# Patient Record
Sex: FEMALE | Race: WHITE | Employment: FULL TIME | URBAN - NONMETROPOLITAN AREA
[De-identification: names, ages, dates, MRNs, and addresses within clinical notes are randomized per-mention and may not be internally consistent; named-entity substitution may affect disease eponyms.]

---

## 2020-10-13 PROBLEM — R51.9 HEADACHE: Status: ACTIVE | Noted: 2020-10-13

## 2020-10-13 PROBLEM — F17.200 NICOTINE DEPENDENCE: Status: ACTIVE | Noted: 2020-10-13

## 2020-10-13 PROBLEM — F32.A DEPRESSION: Status: ACTIVE | Noted: 2020-10-13

## 2020-10-13 PROBLEM — R07.9 CHEST PAIN IN ADULT: Status: ACTIVE | Noted: 2020-10-13

## 2020-10-15 ENCOUNTER — APPOINTMENT (OUTPATIENT)
Dept: MRI IMAGING | Age: 60
DRG: 313 | End: 2020-10-15
Payer: COMMERCIAL

## 2020-10-15 ENCOUNTER — HOSPITAL ENCOUNTER (INPATIENT)
Age: 60
LOS: 1 days | Discharge: HOME | DRG: 313 | End: 2020-10-16
Attending: EMERGENCY MEDICINE | Admitting: INTERNAL MEDICINE
Payer: COMMERCIAL

## 2020-10-15 ENCOUNTER — APPOINTMENT (OUTPATIENT)
Dept: ULTRASOUND IMAGING | Age: 60
DRG: 313 | End: 2020-10-15
Payer: COMMERCIAL

## 2020-10-15 ENCOUNTER — APPOINTMENT (OUTPATIENT)
Dept: CT IMAGING | Age: 60
DRG: 313 | End: 2020-10-15
Payer: COMMERCIAL

## 2020-10-15 ENCOUNTER — APPOINTMENT (OUTPATIENT)
Dept: GENERAL RADIOLOGY | Age: 60
DRG: 313 | End: 2020-10-15
Payer: COMMERCIAL

## 2020-10-15 LAB
ABSOLUTE EOS #: 0.17 K/UL (ref 0–0.44)
ABSOLUTE IMMATURE GRANULOCYTE: <0.03 K/UL (ref 0–0.3)
ABSOLUTE LYMPH #: 2.13 K/UL (ref 1.1–3.7)
ABSOLUTE MONO #: 0.58 K/UL (ref 0.1–1.2)
ANION GAP SERPL CALCULATED.3IONS-SCNC: 10 MMOL/L (ref 9–17)
BASOPHILS # BLD: 1 % (ref 0–2)
BASOPHILS ABSOLUTE: 0.03 K/UL (ref 0–0.2)
BUN BLDV-MCNC: 13 MG/DL (ref 6–20)
BUN/CREAT BLD: 15 (ref 9–20)
CALCIUM SERPL-MCNC: 9.3 MG/DL (ref 8.6–10.4)
CHLORIDE BLD-SCNC: 104 MMOL/L (ref 98–107)
CO2: 25 MMOL/L (ref 20–31)
CREAT SERPL-MCNC: 0.85 MG/DL (ref 0.5–0.9)
DIFFERENTIAL TYPE: NORMAL
EOSINOPHILS RELATIVE PERCENT: 3 % (ref 1–4)
GFR AFRICAN AMERICAN: >60 ML/MIN
GFR NON-AFRICAN AMERICAN: >60 ML/MIN
GFR SERPL CREATININE-BSD FRML MDRD: ABNORMAL ML/MIN/{1.73_M2}
GFR SERPL CREATININE-BSD FRML MDRD: ABNORMAL ML/MIN/{1.73_M2}
GLUCOSE BLD-MCNC: 112 MG/DL (ref 70–99)
HCT VFR BLD CALC: 44.8 % (ref 36.3–47.1)
HEMOGLOBIN: 13.8 G/DL (ref 11.9–15.1)
IMMATURE GRANULOCYTES: 0 %
LYMPHOCYTES # BLD: 33 % (ref 24–43)
MCH RBC QN AUTO: 27 PG (ref 25.2–33.5)
MCHC RBC AUTO-ENTMCNC: 30.8 G/DL (ref 28.4–34.8)
MCV RBC AUTO: 87.7 FL (ref 82.6–102.9)
MONOCYTES # BLD: 9 % (ref 3–12)
NRBC AUTOMATED: 0 PER 100 WBC
PDW BLD-RTO: 12.5 % (ref 11.8–14.4)
PLATELET # BLD: 227 K/UL (ref 138–453)
PLATELET ESTIMATE: NORMAL
PMV BLD AUTO: 11.8 FL (ref 8.1–13.5)
POTASSIUM SERPL-SCNC: 4.1 MMOL/L (ref 3.7–5.3)
RBC # BLD: 5.11 M/UL (ref 3.95–5.11)
RBC # BLD: NORMAL 10*6/UL
SEG NEUTROPHILS: 54 % (ref 36–65)
SEGMENTED NEUTROPHILS ABSOLUTE COUNT: 3.52 K/UL (ref 1.5–8.1)
SODIUM BLD-SCNC: 139 MMOL/L (ref 135–144)
TROPONIN INTERP: NORMAL
TROPONIN INTERP: NORMAL
TROPONIN T: NORMAL NG/ML
TROPONIN T: NORMAL NG/ML
TROPONIN, HIGH SENSITIVITY: <6 NG/L (ref 0–14)
TROPONIN, HIGH SENSITIVITY: <6 NG/L (ref 0–14)
WBC # BLD: 6.4 K/UL (ref 3.5–11.3)
WBC # BLD: NORMAL 10*3/UL

## 2020-10-15 PROCEDURE — 71045 X-RAY EXAM CHEST 1 VIEW: CPT

## 2020-10-15 PROCEDURE — 6370000000 HC RX 637 (ALT 250 FOR IP): Performed by: INTERNAL MEDICINE

## 2020-10-15 PROCEDURE — 36415 COLL VENOUS BLD VENIPUNCTURE: CPT

## 2020-10-15 PROCEDURE — 71260 CT THORAX DX C+: CPT

## 2020-10-15 PROCEDURE — 93005 ELECTROCARDIOGRAM TRACING: CPT | Performed by: EMERGENCY MEDICINE

## 2020-10-15 PROCEDURE — 2580000003 HC RX 258: Performed by: INTERNAL MEDICINE

## 2020-10-15 PROCEDURE — 6360000002 HC RX W HCPCS: Performed by: INTERNAL MEDICINE

## 2020-10-15 PROCEDURE — 80048 BASIC METABOLIC PNL TOTAL CA: CPT

## 2020-10-15 PROCEDURE — 84484 ASSAY OF TROPONIN QUANT: CPT

## 2020-10-15 PROCEDURE — 76705 ECHO EXAM OF ABDOMEN: CPT

## 2020-10-15 PROCEDURE — 99285 EMERGENCY DEPT VISIT HI MDM: CPT

## 2020-10-15 PROCEDURE — 1200000000 HC SEMI PRIVATE

## 2020-10-15 PROCEDURE — 85025 COMPLETE CBC W/AUTO DIFF WBC: CPT

## 2020-10-15 PROCEDURE — 6360000004 HC RX CONTRAST MEDICATION: Performed by: EMERGENCY MEDICINE

## 2020-10-15 RX ORDER — PROMETHAZINE HYDROCHLORIDE 25 MG/1
12.5 TABLET ORAL EVERY 6 HOURS PRN
Status: DISCONTINUED | OUTPATIENT
Start: 2020-10-15 | End: 2020-10-16 | Stop reason: HOSPADM

## 2020-10-15 RX ORDER — METOPROLOL SUCCINATE 25 MG/1
12.5 TABLET, EXTENDED RELEASE ORAL DAILY
Status: DISCONTINUED | OUTPATIENT
Start: 2020-10-16 | End: 2020-10-16 | Stop reason: HOSPADM

## 2020-10-15 RX ORDER — ONDANSETRON 2 MG/ML
4 INJECTION INTRAMUSCULAR; INTRAVENOUS EVERY 6 HOURS PRN
Status: DISCONTINUED | OUTPATIENT
Start: 2020-10-15 | End: 2020-10-16 | Stop reason: HOSPADM

## 2020-10-15 RX ORDER — SODIUM CHLORIDE 0.9 % (FLUSH) 0.9 %
10 SYRINGE (ML) INJECTION PRN
Status: DISCONTINUED | OUTPATIENT
Start: 2020-10-15 | End: 2020-10-16 | Stop reason: HOSPADM

## 2020-10-15 RX ORDER — HYDROCODONE BITARTRATE AND ACETAMINOPHEN 5; 325 MG/1; MG/1
1 TABLET ORAL EVERY 4 HOURS PRN
Status: DISCONTINUED | OUTPATIENT
Start: 2020-10-15 | End: 2020-10-16 | Stop reason: HOSPADM

## 2020-10-15 RX ORDER — LISINOPRIL 2.5 MG/1
2.5 TABLET ORAL DAILY
Status: DISCONTINUED | OUTPATIENT
Start: 2020-10-16 | End: 2020-10-16 | Stop reason: HOSPADM

## 2020-10-15 RX ORDER — ACETAMINOPHEN 325 MG/1
650 TABLET ORAL EVERY 6 HOURS PRN
Status: DISCONTINUED | OUTPATIENT
Start: 2020-10-15 | End: 2020-10-16 | Stop reason: HOSPADM

## 2020-10-15 RX ORDER — BUPROPION HYDROCHLORIDE 150 MG/1
150 TABLET, EXTENDED RELEASE ORAL 2 TIMES DAILY
Status: DISCONTINUED | OUTPATIENT
Start: 2020-10-15 | End: 2020-10-16 | Stop reason: HOSPADM

## 2020-10-15 RX ORDER — SODIUM CHLORIDE 0.9 % (FLUSH) 0.9 %
10 SYRINGE (ML) INJECTION EVERY 12 HOURS SCHEDULED
Status: DISCONTINUED | OUTPATIENT
Start: 2020-10-15 | End: 2020-10-16 | Stop reason: HOSPADM

## 2020-10-15 RX ORDER — POLYETHYLENE GLYCOL 3350 17 G/17G
17 POWDER, FOR SOLUTION ORAL DAILY PRN
Status: DISCONTINUED | OUTPATIENT
Start: 2020-10-15 | End: 2020-10-16 | Stop reason: HOSPADM

## 2020-10-15 RX ORDER — ACETAMINOPHEN 650 MG/1
650 SUPPOSITORY RECTAL EVERY 6 HOURS PRN
Status: DISCONTINUED | OUTPATIENT
Start: 2020-10-15 | End: 2020-10-16 | Stop reason: HOSPADM

## 2020-10-15 RX ORDER — UBIDECARENONE 75 MG
50 CAPSULE ORAL DAILY
Status: DISCONTINUED | OUTPATIENT
Start: 2020-10-15 | End: 2020-10-16 | Stop reason: HOSPADM

## 2020-10-15 RX ORDER — BUSPIRONE HYDROCHLORIDE 10 MG/1
5 TABLET ORAL 3 TIMES DAILY PRN
Status: DISCONTINUED | OUTPATIENT
Start: 2020-10-15 | End: 2020-10-16 | Stop reason: HOSPADM

## 2020-10-15 RX ADMIN — IOPAMIDOL 75 ML: 755 INJECTION, SOLUTION INTRAVENOUS at 18:09

## 2020-10-15 RX ADMIN — Medication 10 ML: at 21:13

## 2020-10-15 RX ADMIN — ENOXAPARIN SODIUM 70 MG: 80 INJECTION SUBCUTANEOUS at 21:09

## 2020-10-15 RX ADMIN — BUPROPION HYDROCHLORIDE 150 MG: 150 TABLET, EXTENDED RELEASE ORAL at 21:09

## 2020-10-15 ASSESSMENT — ENCOUNTER SYMPTOMS
BACK PAIN: 0
VOMITING: 1
ABDOMINAL PAIN: 0
CONSTIPATION: 0
DIARRHEA: 0
NAUSEA: 1
COLOR CHANGE: 0
WHEEZING: 0
SHORTNESS OF BREATH: 0
TROUBLE SWALLOWING: 0
COUGH: 0
ABDOMINAL DISTENTION: 0

## 2020-10-15 NOTE — ED PROVIDER NOTES
[facility] ED  EMERGENCY DEPARTMENT ENCOUNTER      Pt Name:Renee Ny  MRN: [id number]  Birthdate 1960  Date of evaluation: 10/15/2020  Provider: Racquel Park MD    CHIEF COMPLAINT     Chief Complaint   Patient presents with    Chest Pain     Mid chest, intermittent x 3 days. Pt sent by PCP to rule out cardiac issues vs issues after Lap Lady in early Sept         HISTORY OF PRESENT ILLNESS   (Location/Symptom, Timing/Onset, Context/Setting, Quality, Duration, Modifying Factors, Severity)  Note limiting factors. HPI the patient is a 53-year-old female who has been having epigastric pain off and on for the last 3 to 5 days. Apparently her PCP did an EKG that she is concerned about. The patient had an  cholecystectomy early in September. When the pain is present it is very intense at a level 10. It is a pressure. It can radiate to her right shoulder and at other times the left shoulder and arm. She does not get diaphoretic but she does have nausea. Nursing Notes were reviewed. REVIEW OF SYSTEMS    (2-9 systems for level 4, 10 or more for level 5)     Review of Systems   Constitutional: Positive for activity change and appetite change. Negative for fever. HENT: Negative for congestion and trouble swallowing. Eyes: Negative for visual disturbance. Respiratory: Negative for cough, shortness of breath and wheezing. Cardiovascular: Positive for chest pain. Negative for palpitations and leg swelling. Gastrointestinal: Positive for nausea and vomiting. Negative for abdominal distention, abdominal pain, constipation and diarrhea. Genitourinary: Negative for difficulty urinating, dysuria, flank pain and frequency. Musculoskeletal: Negative for back pain and gait problem. Skin: Negative for color change. Neurological: Negative for dizziness, light-headedness and headaches.    Psychiatric/Behavioral: Negative for confusion, decreased concentration and dysphoric mood.              MEDICAL HISTORY     Past Medical History:   Diagnosis Date    Anxiety     Brain aneurysm     2009    Depression     Hypertension     Nicotine dependence          SURGICAL HISTORY       Past Surgical History:   Procedure Laterality Date    BRAIN SURGERY      coils in brain d/t aneurysm    CHOLECYSTECTOMY  2020    TUBAL LIGATION           CURRENT MEDICATIONS       Previous Medications    BUPROPION (WELLBUTRIN SR) 150 MG EXTENDED RELEASE TABLET    Take 150 mg by mouth 2 times daily    BUSPIRONE (BUSPAR) 5 MG TABLET    Take 5 mg by mouth 3 times daily as needed    HYDROCODONE-ACETAMINOPHEN (NORCO) 5-325 MG PER TABLET    TAKE 1 TABLET BY MOUTH EVERY 4 HOURS AS NEEDED FOR PAIN    LISINOPRIL (PRINIVIL;ZESTRIL) 2.5 MG TABLET    TAKE 1 TABLET BY MOUTH ONCE DAILY    METOPROLOL SUCCINATE (TOPROL XL) 25 MG EXTENDED RELEASE TABLET    Take 0.5 tablets by mouth daily    NICOTINE POLACRILEX (COMMIT) 2 MG LOZENGE    Take 2 mg by mouth as needed for Smoking cessation 1 lozenge Q2hrs as needed for smoking cessation    VITAMIN B-12 (CYANOCOBALAMIN) 100 MCG TABLET    Take 50 mcg by mouth daily       ALLERGIES     Augmentin [amoxicillin-pot clavulanate]    FAMILY HISTORY       Family History   Problem Relation Age of Onset    Diabetes Mother     Cancer Sister         x2    Diabetes Paternal Grandmother     Diabetes Paternal Grandfather           SOCIAL HISTORY       Social History     Socioeconomic History    Marital status:      Spouse name: None    Number of children: None    Years of education: None    Highest education level: None   Occupational History    None   Social Needs    Financial resource strain: None    Food insecurity     Worry: None     Inability: None    Transportation needs     Medical: None     Non-medical: None   Tobacco Use    Smoking status: Never Smoker    Smokeless tobacco: Never Used    Tobacco comment: quit smoking 5 years ago   Substance and Sexual Activity    Alcohol use: Never     Frequency: Never    Drug use: Never    Sexual activity: None   Lifestyle    Physical activity     Days per week: None     Minutes per session: None    Stress: None   Relationships    Social connections     Talks on phone: None     Gets together: None     Attends Muslim service: None     Active member of club or organization: None     Attends meetings of clubs or organizations: None     Relationship status: None    Intimate partner violence     Fear of current or ex partner: None     Emotionally abused: None     Physically abused: None     Forced sexual activity: None   Other Topics Concern    None   Social History Narrative    None       SCREENINGS             PHYSICAL EXAM  (up to 7 for level 4, 8 or more for level 5)     ED Triage Vitals   BP Temp Temp Source Pulse Resp SpO2 Height Weight   10/15/20 1202 10/15/20 1200 10/15/20 1200 10/15/20 1200 10/15/20 1200 10/15/20 1200 10/15/20 1200 10/15/20 1200   (!) 168/70 98.3 °F (36.8 °C) Oral 70 18 98 % 5' 1\" (1.549 m) 152 lb (68.9 kg)       Physical Exam  Constitutional:       General: She is not in acute distress. Appearance: Normal appearance. She is obese. HENT:      Head: Normocephalic and atraumatic. Mouth/Throat:      Mouth: Mucous membranes are moist.      Pharynx: Oropharynx is clear. Eyes:      Extraocular Movements: Extraocular movements intact. Conjunctiva/sclera: Conjunctivae normal.      Pupils: Pupils are equal, round, and reactive to light. Neck:      Musculoskeletal: Normal range of motion and neck supple. Cardiovascular:      Rate and Rhythm: Normal rate and regular rhythm. Pulses: Normal pulses. Heart sounds: Normal heart sounds. Pulmonary:      Effort: Pulmonary effort is normal.      Breath sounds: Normal breath sounds. Abdominal:      General: Abdomen is flat. Bowel sounds are normal.      Palpations: Abdomen is soft. Musculoskeletal: Normal range of motion.    Skin:     General: Skin is warm and dry. Neurological:      General: No focal deficit present. Mental Status: She is alert and oriented to person, place, and time. Mental status is at baseline. Cranial Nerves: No cranial nerve deficit. Sensory: No sensory deficit. Motor: No weakness. Coordination: Coordination normal.   Psychiatric:         Mood and Affect: Mood normal.         Behavior: Behavior normal.         Thought Content: Thought content normal.         Judgment: Judgment normal.         DIAGNOSTIC RESULTS     EKG: All EKG's are interpreted by the Emergency Department Physician whoeither signs or Co-signs this chart in the absence of a cardiologist.      RADIOLOGY:   Non-plain film images such as CT, Ultrasound and MRI are read by the radiologist. Plain radiographic images are visualized and preliminarily interpreted by the emergency physician     Interpretation per the Radiologist below, if available at the time of this note:    US ABDOMEN LIMITED   Final Result   Unremarkable right upper quadrant ultrasound status post cholecystectomy.          XR CHEST PORTABLE   Final Result   Cardiomegaly, otherwise, no acute abnormalities seen in the chest         MRI ABDOMEN WO CONTRAST    (Results Pending)   CT CHEST PULMONARY EMBOLISM W CONTRAST    (Results Pending)         ED BEDSIDE ULTRASOUND:   Performed by ED Physician - none    LABS:  Labs Reviewed   BASIC METABOLIC PANEL - Abnormal; Notable for the following components:       Result Value    Glucose 112 (*)     All other components within normal limits   CBC WITH AUTO DIFFERENTIAL   TROPONIN   TROPONIN       EMERGENCY DEPARTMENT COURSE and DIFFERENTIAL DIAGNOSIS/MDM:   Vitals:    Vitals:    10/15/20 1609 10/15/20 1621 10/15/20 1641 10/15/20 1703   BP:       Pulse: 66 73 86 62   Resp: 15 22 (!) 31 26   Temp:       TempSrc:       SpO2: 97% 98% 99% 98%   Weight:       Height:               MDM the patient will be admitted for observation and repeat cardiac studies in the a.m. Follow-up with cardiology in the a.m. Dr. Rogelio Newby is admitting the patient tonight as hospitalist.  PE study was negative. CONSULTS:  None    PROCEDURES:  Unless otherwise noted below, none     Procedures    FINAL IMPRESSION      1. Chest pain, unspecified type          DISPOSITION/PLAN   DISPOSITION Decision To Admit 10/15/2020 05:48:31 PM      PATIENT REFERRED TO:  No follow-up provider specified.     DISCHARGE MEDICATIONS:  New Prescriptions    No medications on file              (Please note that portions ofthis note were completed with a voice recognition program.  Efforts were made to edit the dictations but occasionally words are mis-transcribed.)      Wilfred Haley MD (electronically signed)  Attending Emergency Physician          Jesu Mancia MD  10/15/20 Arianna Gutierrez MD  10/16/20 [id number]

## 2020-10-15 NOTE — ED NOTES
[facility] Dr Sina Ovalle, connected call to Dr Sina Ovalle.      [facility]  10/15/20 [id number]

## 2020-10-15 NOTE — PROGRESS NOTES
Patient admitted to the floor. Walked to the bathroom independently and personal effects are in the closet. Patient alert and oriented and vitals are stable. Patient answering questions.

## 2020-10-16 ENCOUNTER — APPOINTMENT (OUTPATIENT)
Dept: NON INVASIVE DIAGNOSTICS | Age: 60
DRG: 313 | End: 2020-10-16
Payer: COMMERCIAL

## 2020-10-16 VITALS
BODY MASS INDEX: 29.89 KG/M2 | TEMPERATURE: 96.9 F | HEART RATE: 58 BPM | HEIGHT: 61 IN | RESPIRATION RATE: 20 BRPM | OXYGEN SATURATION: 96 % | DIASTOLIC BLOOD PRESSURE: 71 MMHG | SYSTOLIC BLOOD PRESSURE: 124 MMHG | WEIGHT: 158.29 LBS

## 2020-10-16 LAB
ALBUMIN SERPL-MCNC: 3.9 G/DL (ref 3.5–5.2)
ALBUMIN/GLOBULIN RATIO: 1.1 (ref 1–2.5)
ALP BLD-CCNC: 95 U/L (ref 35–104)
ALT SERPL-CCNC: 26 U/L (ref 5–33)
ANION GAP SERPL CALCULATED.3IONS-SCNC: 9 MMOL/L (ref 9–17)
AST SERPL-CCNC: 20 U/L
BILIRUB SERPL-MCNC: 0.38 MG/DL (ref 0.3–1.2)
BUN BLDV-MCNC: 14 MG/DL (ref 6–20)
BUN/CREAT BLD: 18 (ref 9–20)
CALCIUM SERPL-MCNC: 9.1 MG/DL (ref 8.6–10.4)
CHLORIDE BLD-SCNC: 99 MMOL/L (ref 98–107)
CO2: 24 MMOL/L (ref 20–31)
CREAT SERPL-MCNC: 0.78 MG/DL (ref 0.5–0.9)
EKG ATRIAL RATE: 59 BPM
EKG ATRIAL RATE: 69 BPM
EKG P AXIS: 51 DEGREES
EKG P AXIS: 65 DEGREES
EKG P-R INTERVAL: 182 MS
EKG P-R INTERVAL: 210 MS
EKG Q-T INTERVAL: 374 MS
EKG Q-T INTERVAL: 376 MS
EKG QRS DURATION: 108 MS
EKG QRS DURATION: 86 MS
EKG QTC CALCULATION (BAZETT): 370 MS
EKG QTC CALCULATION (BAZETT): 402 MS
EKG R AXIS: -40 DEGREES
EKG R AXIS: -46 DEGREES
EKG T AXIS: 28 DEGREES
EKG T AXIS: 54 DEGREES
EKG VENTRICULAR RATE: 59 BPM
EKG VENTRICULAR RATE: 69 BPM
GFR AFRICAN AMERICAN: >60 ML/MIN
GFR NON-AFRICAN AMERICAN: >60 ML/MIN
GFR SERPL CREATININE-BSD FRML MDRD: ABNORMAL ML/MIN/{1.73_M2}
GFR SERPL CREATININE-BSD FRML MDRD: ABNORMAL ML/MIN/{1.73_M2}
GLUCOSE BLD-MCNC: 109 MG/DL (ref 70–99)
HCT VFR BLD CALC: 44.5 % (ref 36.3–47.1)
HEMOGLOBIN: 13.6 G/DL (ref 11.9–15.1)
LV EF: 65 %
LVEF MODALITY: NORMAL
MCH RBC QN AUTO: 27.1 PG (ref 25.2–33.5)
MCHC RBC AUTO-ENTMCNC: 30.6 G/DL (ref 28.4–34.8)
MCV RBC AUTO: 88.6 FL (ref 82.6–102.9)
NRBC AUTOMATED: 0 PER 100 WBC
PDW BLD-RTO: 12.5 % (ref 11.8–14.4)
PLATELET # BLD: 244 K/UL (ref 138–453)
PMV BLD AUTO: 12.2 FL (ref 8.1–13.5)
POTASSIUM SERPL-SCNC: 4 MMOL/L (ref 3.7–5.3)
RBC # BLD: 5.02 M/UL (ref 3.95–5.11)
SODIUM BLD-SCNC: 132 MMOL/L (ref 135–144)
TOTAL PROTEIN: 7.3 G/DL (ref 6.4–8.3)
WBC # BLD: 6.1 K/UL (ref 3.5–11.3)

## 2020-10-16 PROCEDURE — 36415 COLL VENOUS BLD VENIPUNCTURE: CPT

## 2020-10-16 PROCEDURE — 3430000000 HC RX DIAGNOSTIC RADIOPHARMACEUTICAL: Performed by: INTERNAL MEDICINE

## 2020-10-16 PROCEDURE — 6360000002 HC RX W HCPCS: Performed by: INTERNAL MEDICINE

## 2020-10-16 PROCEDURE — A9500 TC99M SESTAMIBI: HCPCS | Performed by: INTERNAL MEDICINE

## 2020-10-16 PROCEDURE — 6370000000 HC RX 637 (ALT 250 FOR IP): Performed by: INTERNAL MEDICINE

## 2020-10-16 PROCEDURE — 93017 CV STRESS TEST TRACING ONLY: CPT

## 2020-10-16 PROCEDURE — 85027 COMPLETE CBC AUTOMATED: CPT

## 2020-10-16 PROCEDURE — 80053 COMPREHEN METABOLIC PANEL: CPT

## 2020-10-16 PROCEDURE — 93010 ELECTROCARDIOGRAM REPORT: CPT | Performed by: INTERNAL MEDICINE

## 2020-10-16 PROCEDURE — 78452 HT MUSCLE IMAGE SPECT MULT: CPT

## 2020-10-16 PROCEDURE — 93306 TTE W/DOPPLER COMPLETE: CPT

## 2020-10-16 RX ORDER — PANTOPRAZOLE SODIUM 20 MG/1
40 TABLET, DELAYED RELEASE ORAL DAILY
Qty: 30 TABLET | Refills: 0 | Status: SHIPPED | OUTPATIENT
Start: 2020-10-16 | End: 2020-10-20

## 2020-10-16 RX ADMIN — LISINOPRIL 2.5 MG: 2.5 TABLET ORAL at 15:27

## 2020-10-16 RX ADMIN — Medication 30 MILLICURIE: at 11:14

## 2020-10-16 RX ADMIN — METOPROLOL SUCCINATE 12.5 MG: 25 TABLET, EXTENDED RELEASE ORAL at 12:05

## 2020-10-16 RX ADMIN — Medication 10 MILLICURIE: at 09:37

## 2020-10-16 RX ADMIN — REGADENOSON 0.4 MG: 0.08 INJECTION, SOLUTION INTRAVENOUS at 11:14

## 2020-10-16 ASSESSMENT — ENCOUNTER SYMPTOMS
COUGH: 0
VOMITING: 1
TROUBLE SWALLOWING: 0
COLOR CHANGE: 0
CONSTIPATION: 0
NAUSEA: 1
SHORTNESS OF BREATH: 0
DIARRHEA: 0
ABDOMINAL PAIN: 0
ABDOMINAL DISTENTION: 0
BACK PAIN: 0
WHEEZING: 0

## 2020-10-16 NOTE — PROGRESS NOTES
Observed patient walking in room. States she is getting bored and waiting on her daughter to visit. Denies any needs at this time.

## 2020-10-16 NOTE — PLAN OF CARE
Problem: Cardiac:  Goal: Ability to maintain an adequate cardiac output will improve  10/16/2020 1142 by Aden Mirza RN  Outcome: Ongoing  10/16/2020 0229 by Blanca Murray RN  Outcome: Ongoing  Goal: Hemodynamic stability will improve  10/16/2020 1142 by Aden Mirza RN  Outcome: Ongoing  10/16/2020 0229 by Blanca Murray RN  Outcome: Ongoing     Problem: Fluid Volume:  Goal: Ability to achieve and maintain adequate urine output will improve  10/16/2020 1142 by Aden Mirza RN  Outcome: Ongoing  10/16/2020 0229 by Blanca Murray RN  Outcome: Ongoing     Problem: Respiratory:  Goal: Respiratory status will improve  10/16/2020 1142 by Aden Mirza RN  Outcome: Ongoing  10/16/2020 0229 by Blanca Murray RN  Outcome: Ongoing

## 2020-10-16 NOTE — PLAN OF CARE
Problem: Cardiac:  Goal: Ability to maintain an adequate cardiac output will improve  Description: Ability to maintain an adequate cardiac output will improve  Outcome: Ongoing  Note: Vitals and pulses are checked twice per shift and PRN. Patient remains on Telemetry. Labs are being drawn daily. Cardiology has been consulted. Goal: Hemodynamic stability will improve  Description: Hemodynamic stability will improve  Outcome: Ongoing     Problem: Fluid Volume:  Goal: Ability to achieve and maintain adequate urine output will improve  Description: Ability to achieve and maintain adequate urine output will improve  Outcome: Ongoing  Note: Maintain hydration by drinking small amounts of clear fluids frequently. Vitals and pulses are checked twice per shift and PRN. Skin is being assessed and I&O's recorded. Labs are being drawn daily. Respiratory status being checked with vitals and PRN. Problem: Respiratory:  Goal: Respiratory status will improve  Description: Respiratory status will improve  Outcome: Ongoing  Note: Lung sounds are assessed twice per shift and PRN. I&O is being monitored each shift. Patient is encouraged to use cough and deep breathing to increase lung capacity.

## 2020-10-16 NOTE — FLOWSHEET NOTE
Reviewed discharge instructions with patient. Voices understanding. To front entrance per w/cc for discharge home.

## 2020-10-16 NOTE — H&P
Internal Medicine History and Physical    Patient:  Christina Gould  MRN: [id number]    Chief Complaint:  Chest pain    History Obtained From:  patient, electronic medical record  PCP: JUNO Miller - CNP    History of Present Illness: The patient is a 64 y.o. female who presented to the ER yesterday with chest discomfort which started 3 days ago. The discomfort has resolved. She describes the discomfort as pressure followed by pain located at the substernal area and rates the pain at about a very intense when the pain hits. It does not radiate to the none. She denies dyspnea, exertional chest pressure/discomfort, fatigue, irregular heart beat, lower extremity edema, near-syncope, orthopnea, palpitations, paroxysmal nocturnal dyspnea, syncope and tachypnea, fever, chills, abdominal pain, nausea, vomiting, diarrhea and constipation. She does have a previous history of known coronary artery disease. Cardiac risk factors include: hypertension and current smoker    Past Medical History:        Diagnosis Date    Anxiety     Brain aneurysm     2009    Depression     Hypertension     Nicotine dependence        Past Surgical History:        Procedure Laterality Date    BRAIN SURGERY      coils in brain d/t aneurysm    CHOLECYSTECTOMY  2020    TUBAL LIGATION         Medications Prior to Admission:    Prior to Admission medications    Medication Sig Start Date End Date Taking?  Authorizing Provider   lisinopril (PRINIVIL;ZESTRIL) 2.5 MG tablet TAKE 1 TABLET BY MOUTH ONCE DAILY 9/23/20  Yes Historical Provider, MD   buPROPion (WELLBUTRIN SR) 150 MG extended release tablet Take 150 mg by mouth 2 times daily   Yes Historical Provider, MD   vitamin B-12 (CYANOCOBALAMIN) 100 MCG tablet Take 50 mcg by mouth daily   Yes Historical Provider, MD   nicotine polacrilex (COMMIT) 2 MG lozenge Take 2 mg by mouth as needed for Smoking cessation 1 lozenge Q2hrs as needed for smoking cessation   Yes JUNO Perez - CNP   metoprolol succinate (TOPROL XL) 25 MG extended release tablet Take 0.5 tablets by mouth daily 10/13/20  Yes Thierno Barrow APRRAMÍREZ - CNP   HYDROcodone-acetaminophen (NORCO) 5-325 MG per tablet TAKE 1 TABLET BY MOUTH EVERY 4 HOURS AS NEEDED FOR PAIN 9/2/20   Thierno Barrow APRN - CNP   busPIRone (BUSPAR) 5 MG tablet Take 5 mg by mouth 3 times daily as needed    Historical Provider, MD       Allergies:  Augmentin [amoxicillin-pot clavulanate]    Social History:   TOBACCO:   reports that she has never smoked. She has never used smokeless tobacco.  ELICIT DRUG USE:    Social History     Substance and Sexual Activity   Drug Use Never     ETOH:   reports no history of alcohol use. Family History:       Problem Relation Age of Onset    Diabetes Mother     Cancer Sister         x2    Diabetes Paternal Grandmother     Diabetes Paternal Grandfather        Review of Systems:  Constitutional:negative  for fevers, and negative for chills. Respiratory: negative for shortness of breath, negative for cough, and negative for wheezing  Cardiovascular: Positive for chest pain, negative for palpitations, and negative for syncope  Gastrointestinal: negative for abdominal pain, negative for nausea,negative for vomiting, negative for diarrhea, negative for constipation, and negative for hematochezia or melena  Genitourinary: negative for dysuria, negative for urinary urgency, negative for urinary frequency, and negative for hematuria  Neurological: negative for unilateral weakness, numbness or tingling.     All other systems were reviewed with the patient and are negative except as stated    Objective:    Vitals:   Vitals:    10/16/20 0650   BP: 124/71   Pulse: 58   Resp: 20   Temp: 96.9 °F (36.1 °C)   SpO2: 96%     Weight: 158 lb 4.6 oz (71.8 kg)   Height: 5' 1\" (154.9 cm)   -----------------------------------------------------------------  Exam:  GEN:  alert and oriented to person, place and time, well-developed and stay is 1 day  · Discussed patient's symptoms and data results including labs and imaging studies with the ER MD at time of admission  · Monitor serial cardiac enzymes  · No aspirin therapy  · Lovenox for DVT prophylaxis  · Will order Lexican stress test with nuclear imaging as an inpatient today  · If stress test is negative the patient will be discharged and will treat for GERD and will have close f/u in next 1-2 weeks with PCP  · High risk drug monitoring: None    CORE MEASURES  DVT prophylaxis: Lovenox  Decubitus ulcer present on admission: No  CODE STATUS: FULL CODE  Nutrition Status: good   Physical therapy: NA   Old Charts reviewed: Yes  EKG Reviewed:  Yes  Advance Directive Addressed: Yes    JUNO Wright, NP-C  10/16/2020, 1:25 PM

## 2020-10-16 NOTE — PROCEDURES
[address], [place] [address]                              CARDIAC STRESS TEST    PATIENT NAME: Christopher Batres                   :        1960  MED REC NO:   [id number]                              ROOM:       0319  ACCOUNT NO:   [de-identified]                           ADMIT DATE: 10/15/2020  PROVIDER:     King Batista. [facility]    CARDIOVASCULAR DIAGNOSTIC DEPARTMENT    DATE OF STUDY:  10/15/2020    ORDERING PROVIDER:  Danii Cooper MD    PRIMARY CARE PROVIDER:  JUNO Beltran-CNP    INTERPRETING PHYSICIAN:  King Batista. Serafin Willis MD    EXERCISE MYOCARDIAL PERFUSION STRESS TEST REPORT    Rest/stress single-isotope SPECT imaging with exercise stress and gated  SPECT imaging. INDICATION:  Assessment of recent chest pain and/or discomfort. CLINICAL HISTORY:  The patient is a 56-year-old woman with no known  coronary artery disease. Previous cardiac history includes:  None    Other previous history includes:  Chest pain, palpitations, dyspnea,  CVA, lightheadedness, hypertension. Symptoms just prior to testing included:  None    Relevant medications:  Metoprolol. PROCEDURE:  The patient performed treadmill exercise using a Steve  protocol, completing 7:33 minutes and completing an estimated workload  of 3.96 metabolic equivalents (METS). The patient was unable to continue exercise testing due to shortness of  breath and leg fatigue, and so regadenoson, at a dose of 0.4 mg, was  given in order to optimize cardiac stress imaging. The test was terminated due to fatigue and shortness of breath. The heart rate was 74 beats per minute at baseline and increased to 123  beats per minute at peak exercise, which was 75% of the maximum  predicted heart rate. The rest blood pressure was 120/68 mmHg and  increased to 132/60 mmHg, which is a normal response.  During the  procedure, the patient developed fatigue, shortness of breath and leg  fatigue, but denied any chest discomfort. Myocardial perfusion imaging: Imaging was performed at rest 30-45  minutes following the injection of 10 mCi of sestamibi. At peak  exercise, the patient was injected with 30 mCi of sestamibi and exercise  was continued for 1 minute. Gating post-stress tomographic imaging was  performed 30-45 minutes after stress. STRESS ECG RESULTS:  The resting electrocardiogram demonstrated normal  sinus rhythm without definitive ST-segment abnormalities suggestive of  myocardial ischemia. At peak exercise and during recovery, the patient developed:    No significant ST segment changes suggestive of myocardial ischemia with  no premature atrial contractions (PACs) and no premature ventricular  contractions (PVCs). NUCLEAR IMAGING RESULTS:  The overall quality of the study is fair. No  significant attenuation artifact was seen. There is no evidence of  abnormal lung uptake. Additionally, the right ventricle appears normal.  The left ventricular cavity is noted to be normal in size on the stress  images. There is no evidence of transient ischemic dilatation (TID) of  the left ventricle. Gated SPECT imaging reveals normal myocardial thickening and wall motion  with a calculated left ventricular ejection fraction of 78%. The rest images demonstrate homogeneous tracer distribution throughout  the myocardium. SPECT images demonstrate homogeneous tracer distribution throughout the  myocardium. IMPRESSION:  1. Normal myocardial perfusion imaging without significant evidence of  myocardial ischemia or infarction. 2.  Global left ventricular systolic function was normal with an EF of  78% without regional wall motion abnormalities. 3.  No significant electrocardiographic evidence of myocardial ischemia  during EKG monitoring without significant associated arrhythmias.     The patient's Duke Treadmill score is 7, which correlates with a low  risk for significant coronary artery disease. Overall, these results are most consistent with a low risk for scan. The sensitivity for detecting ischemia on this test may have been  reduced due to the patient being on a beta blocker. JUANITA GALDAMEZ    D: 10/16/2020 14:16:17       T: 10/16/2020 14:18:02     [email or web address]  Job#: [id number]     Doc#: Unknown    CC:  Don Olivier

## 2020-10-16 NOTE — PROGRESS NOTES
Comprehensive Nutrition Assessment    Type and Reason for Visit:  Initial, Patient Education    Nutrition Recommendations/Plan:  Heart healthy diet    Nutrition Assessment:  Food and nutrition related knowledge deficit r/t cardiac dysfunction, AEB lack of prior heart healthy education. No ingestion or appetite issues pta. Accepting of heart healthy education and discussion. Malnutrition Assessment:  Malnutrition Status:  No malnutrition      Nutrition Related Findings:  heavier appearing than bmi suggests, no edema      Wounds:  None       Current Nutrition Therapies:    DIET GENERAL; No Added Salt (3-4 GM)    Anthropometric Measures:  · Height: 5' 1\" (154.9 cm)  · Current Body Weight: 158 lb 4.6 oz (71.8 kg)   · Admission Body Weight: 152 lb (68.9 kg)    · Usual Body Weight: 154 lb (69.9 kg)(per patient (recent MD appointment))     · Ideal Body Weight: 105 lbs; % Ideal Body Weight 150.8 %   · BMI: 29.9  · Adjusted Body Weight:  ; No Adjustment   · BMI Categories: Overweight (BMI 25.0-29. 9)       Nutrition Diagnosis:   · Food & Nutrition-related knowledge deficit related to cardiac dysfunction as evidenced by other (comment)(lack of prior education)    Lab Results   Component Value Date     (L) 10/16/2020    K 4.0 10/16/2020    CL 99 10/16/2020    CO2 24 10/16/2020    BUN 14 10/16/2020    CREATININE 0.78 10/16/2020    GLUCOSE 109 (H) 10/16/2020    CALCIUM 9.1 10/16/2020    PROT 7.3 10/16/2020    LABALBU 3.9 10/16/2020    BILITOT 0.38 10/16/2020    ALKPHOS 95 10/16/2020    AST 20 10/16/2020    ALT 26 10/16/2020    LABGLOM >60 10/16/2020    GFRAA >60 10/16/2020     Nutrition Interventions:   Food and/or Nutrient Delivery:  Continue Current Diet  Nutrition Education/Counseling:  Education initiated   Coordination of Nutrition Care:  Continued Inpatient Monitoring    Goals:  PO >75% meals with heart healthy choices       Nutrition Monitoring and Evaluation:   Behavioral-Environmental Outcomes:  (none) Food/Nutrient Intake Outcomes:  Diet Advancement/Tolerance  Physical Signs/Symptoms Outcomes:  Biochemical Data, Weight     Discharge Planning:    Continue current diet     Electronically signed by Emile Guevara RD, LD on 10/16/20 at 8:27 AM EDT    Contact: [phone]

## 2020-10-16 NOTE — PROGRESS NOTES
SW met with pt to complete assessment. Pt is alert and oriented and pleasant throughout assessment. Pt is a 64year old  female admitted for chest pain. Pt lives with her spouse and her 12year old son in their home in [facility]. Pt is not using any DME or community services at home. Pt drives and is able to get herself to appointments. Pt is a full code and follows with Temple Osgood, CNP as PCP. Pt does not have advance directives and is not currently interested in these. Pt reports that her medications are covered well by insurance. Pt plans to return home with family at discharge and is hopeful this is today. Pt identifies no discharge needs or concerns at this time. SW will remain available as needed.  Kylee GARCIA 10/16/2020

## 2020-10-16 NOTE — DISCHARGE SUMMARY
Discharge [facility]  :  1960  MRN:  [id number]    Admit date:  10/15/2020      Discharge date: 10/16/2020     Admitting Physician:  Gertrude Garcia MD    Discharge Diagnoses:    Principal Problem:    Chest pain  Resolved Problems:    * No resolved hospital problems. *      Hospital Course:   Bruno Pittman is a 64 y.o. female admitted with Chest Pain. She presented to the ER yesterday with chest discomfort which started 3 days ago. The discomfort has resolved. She describes the discomfort as pressure followed by pain located at the substernal area and rates the pain at about a very intense when the pain hits. It does not radiate to the none. She denies dyspnea, exertional chest pressure/discomfort, fatigue, irregular heart beat, lower extremity edema, near-syncope, orthopnea, palpitations, paroxysmal nocturnal dyspnea, syncope and tachypnea, fever, chills, abdominal pain, nausea, vomiting, diarrhea and constipation. She does have a previous history of known coronary artery disease. Cardiac risk factors include: hypertension and current smoker. She completed a Cardiac Stress Test today and it was normal.  I will discharge her today and treat her for GERD. I will have her try Protonix and follow up with her PCP. Consultants:  none    Procedures: Stress Test    Complications: none    Discharge Condition: good    Exam:  GEN:  alert and oriented to person, place and time, well-developed and well-nourished, in no acute distress  EYES: No gross abnormalities. , PERRL and EOMI  NECK: normal, supple, no lymphadenopathy,  no carotid bruits  PULM: clear to auscultation bilaterally- no wheezes, rales or rhonchi, normal air movement, no respiratory distress  COR: regular rate & rhythm, no murmurs, no gallops, S1 normal and S2 normal  ABD:  soft, non-tender, non-distended, normal bowel sounds, no masses or organomegaly  EXT:   no cyanosis, clubbing or edema present    NEURO: follows commands, [place], no deficits  SKIN:  no rashes or significant lesions    Significant Diagnostic Studies:   Lab Results   Component Value Date    WBC 6.1 10/16/2020    HGB 13.6 10/16/2020     10/16/2020       Lab Results   Component Value Date    BUN 14 10/16/2020    CREATININE 0.78 10/16/2020     (L) 10/16/2020    K 4.0 10/16/2020    CALCIUM 9.1 10/16/2020    CL 99 10/16/2020    CO2 24 10/16/2020    LABGLOM >60 10/16/2020       No results found for: Robyn Ponto, EPITHUA, LEUKOCYTESUR, SPECGRAV, GLUCOSEU, KETUA, PROTEINU, HGBUR, CASTUA, CRYSTUA, BACTERIA, YEAST    Xr Chest Portable    Result Date: 10/15/2020  EXAMINATION: ONE XRAY VIEW OF THE CHEST 10/15/2020 12:40 pm COMPARISON: None. HISTORY: ORDERING SYSTEM PROVIDED HISTORY: CP TECHNOLOGIST PROVIDED HISTORY: CP FINDINGS: The lungs are without acute focal process. There is no effusion or pneumothorax. Cardiomegaly. The osseous structures are without acute process. Cardiomegaly, otherwise, no acute abnormalities seen in the chest     Ct Chest Pulmonary Embolism W Contrast    Result Date: 10/15/2020  EXAMINATION: CTA OF THE CHEST 10/15/2020 6:06 pm TECHNIQUE: CTA of the chest was performed after the administration of intravenous contrast.  Multiplanar reformatted images are provided for review. MIP images are provided for review. Dose modulation, iterative reconstruction, and/or weight based adjustment of the mA/kV was utilized to reduce the radiation dose to as low as reasonably achievable. COMPARISON: None. HISTORY: ORDERING SYSTEM PROVIDED HISTORY: Episodic chest pain TECHNOLOGIST PROVIDED HISTORY: Episodic chest pain FINDINGS: Pulmonary Arteries: Pulmonary arteries are adequately opacified for evaluation. No evidence of intraluminal filling defect to suggest pulmonary embolism. Main pulmonary artery is normal in caliber. Mediastinum: No evidence of mediastinal lymphadenopathy. The heart and pericardium demonstrate no acute abnormality.   There is no acute abnormality of the thoracic aorta. Lungs/pleura: The lungs are without acute process. No focal consolidation or pulmonary edema. No evidence of pleural effusion or pneumothorax. Upper Abdomen: Limited images of the upper abdomen are unremarkable. Soft Tissues/Bones: No acute bone or soft tissue abnormality. No evidence of pulmonary embolism or acute pulmonary abnormality. Us Abdomen Limited    Result Date: 10/15/2020  EXAMINATION: RIGHT UPPER QUADRANT ULTRASOUND 10/15/2020 4:31 pm COMPARISON: None. HISTORY: ORDERING SYSTEM PROVIDED HISTORY: Evaluate for hepatic duct dilatation or stone. TECHNOLOGIST PROVIDED HISTORY: Evaluate for hepatic duct dilatation or stone. FINDINGS: LIVER:  The liver demonstrates normal echogenicity without evidence of intrahepatic biliary ductal dilatation. BILIARY SYSTEM:  Status post cholecystectomy. Common bile duct is within normal limits measuring 5 mm. RIGHT KIDNEY: The right kidney is grossly unremarkable without evidence of hydronephrosis. PANCREAS:  Visualized portions of the pancreas are unremarkable. OTHER: No evidence of right upper quadrant ascites. Unremarkable right upper quadrant ultrasound status post cholecystectomy.        Assessment and Plan:  Patient Active Problem List    Diagnosis Date Noted    Depression 10/13/2020    Headache 10/13/2020    Nicotine dependence 10/13/2020    Chest pain 10/13/2020    Anxiety 10/06/2015        Discharge Medications:       Rios Offer   Home Medication Instructions [id number]    Printed on:10/16/20 1225   Medication Information                      buPROPion (WELLBUTRIN SR) 150 MG extended release tablet  Take 150 mg by mouth 2 times daily             busPIRone (BUSPAR) 5 MG tablet  Take 5 mg by mouth 3 times daily as needed             HYDROcodone-acetaminophen (NORCO) 5-325 MG per tablet  TAKE 1 TABLET BY MOUTH EVERY 4 HOURS AS NEEDED FOR PAIN             lisinopril (PRINIVIL;ZESTRIL) 2.5 MG tablet  TAKE 1 TABLET BY MOUTH ONCE DAILY             metoprolol succinate (TOPROL XL) 25 MG extended release tablet  Take 0.5 tablets by mouth daily             nicotine polacrilex (COMMIT) 2 MG lozenge  Take 2 mg by mouth as needed for Smoking cessation 1 lozenge Q2hrs as needed for smoking cessation             pantoprazole (PROTONIX) 20 MG tablet  Take 2 tablets by mouth daily             vitamin B-12 (CYANOCOBALAMIN) 100 MCG tablet  Take 50 mcg by mouth daily                 Patient Instructions:    Activity: activity as tolerated  Diet: encourage fluids  Wound Care: none needed  Other: None     Disposition:   Discharge to Home    Follow up:  Patient will be followed by JUNO Jacobsen CNP in 1-2 weeks    CORE MEASURES on Discharge (if applicable)  ACE/ARB in CHF: NA  Statin in MI: NA  ASA in MI: NA  Statin in CVA: NA  Antiplatelet in CVA: NA    Total time spent on discharge services: 35 minutes    Including the following activities:  Evaluation and Management of patient  Discussion with patient and/or surrogate about current care plan  Coordination with Case Management and/or   Coordination of care with Consultants (if applicable)   Coordination of care with Receiving Facility Physician (if applicable)  Completion of DME forms (if applicable)  Preparation of Discharge Summary  Preparation of Medication Reconciliation  Preparation of Discharge Prescriptions    Signed:  JUNO Hernandez, NP-C  10/16/2020, 2:15 PM

## 2020-10-20 PROBLEM — R10.13 ACUTE EPIGASTRIC PAIN: Status: ACTIVE | Noted: 2020-10-20

## 2021-04-12 PROBLEM — M79.89 RIGHT AXILLARY SWELLING: Status: ACTIVE | Noted: 2021-04-12

## 2021-07-29 PROBLEM — M54.2 NECK PAIN WITHOUT INJURY: Status: ACTIVE | Noted: 2021-07-29
